# Patient Record
Sex: FEMALE | Race: WHITE | NOT HISPANIC OR LATINO | Employment: OTHER | URBAN - METROPOLITAN AREA
[De-identification: names, ages, dates, MRNs, and addresses within clinical notes are randomized per-mention and may not be internally consistent; named-entity substitution may affect disease eponyms.]

---

## 2018-08-24 PROBLEM — S22.000D COMPRESSION FRACTURE OF THORACIC VERTEBRA WITH ROUTINE HEALING: Status: ACTIVE | Noted: 2018-08-24

## 2018-10-09 PROBLEM — M81.0 OSTEOPOROSIS: Status: ACTIVE | Noted: 2018-10-09

## 2019-08-30 ENCOUNTER — HOSPITAL ENCOUNTER (OUTPATIENT)
Dept: RADIOLOGY | Facility: HOSPITAL | Age: 69
Discharge: HOME | End: 2019-08-30
Attending: INTERNAL MEDICINE
Payer: MEDICARE

## 2019-08-30 DIAGNOSIS — E21.3 HYPERPARATHYROIDISM: ICD-10-CM

## 2019-08-30 DIAGNOSIS — E04.9 NON-TOXIC GOITER: ICD-10-CM

## 2019-08-30 PROCEDURE — 76536 US EXAM OF HEAD AND NECK: CPT | Mod: TC

## 2019-08-30 PROCEDURE — 76536 US EXAM OF HEAD AND NECK: CPT | Mod: 26,,, | Performed by: RADIOLOGY

## 2019-08-30 PROCEDURE — 76536 US THYROID: ICD-10-PCS | Mod: 26,,, | Performed by: RADIOLOGY

## 2025-05-29 PROBLEM — F10.29 ALCOHOL DEPENDENCE WITH UNSPECIFIED ALCOHOL-INDUCED DISORDER: Status: ACTIVE | Noted: 2025-05-29

## 2025-06-30 PROBLEM — R11.2 NAUSEA & VOMITING: Status: ACTIVE | Noted: 2025-06-30

## 2025-06-30 PROBLEM — E87.6 HYPOKALEMIA: Status: ACTIVE | Noted: 2025-06-30

## 2025-06-30 PROBLEM — R93.3 ABNORMAL COMPUTED TOMOGRAPHY OF LARGE INTESTINE: Status: ACTIVE | Noted: 2025-06-30

## 2025-06-30 PROBLEM — K52.9 COLITIS: Status: ACTIVE | Noted: 2025-06-30

## 2025-07-10 ENCOUNTER — TELEPHONE (OUTPATIENT)
Dept: HEPATOLOGY | Facility: CLINIC | Age: 75
End: 2025-07-10

## 2025-07-10 NOTE — TELEPHONE ENCOUNTER
Copied from CRM #[id number]. Topic: Appointments - Appointment Access  >> Jul 10, 2025  8:07 AM Jennyfer wrote:  RESCHEDULE/APPTS    Current Appt Date: 09/10/25    Type of Appt: NP    Physician: Polly     Reason for Scheduling: Pt would like to be  seen sooner for evaluation if possible please reach out to further discuss scheduling options     Caller: Savita    Contact Preference: [phone] (Mobile) [phone] (home)

## 2025-07-22 DIAGNOSIS — R74.8 ELEVATED LIVER ENZYMES: ICD-10-CM

## 2025-07-22 DIAGNOSIS — K76.0 FATTY LIVER: Primary | ICD-10-CM

## 2025-07-22 DIAGNOSIS — K74.60 CIRRHOSIS, NON-ALCOHOLIC: ICD-10-CM
